# Patient Record
Sex: FEMALE | Race: WHITE | NOT HISPANIC OR LATINO
[De-identification: names, ages, dates, MRNs, and addresses within clinical notes are randomized per-mention and may not be internally consistent; named-entity substitution may affect disease eponyms.]

---

## 2018-01-02 ENCOUNTER — RX RENEWAL (OUTPATIENT)
Age: 63
End: 2018-01-02

## 2018-01-02 PROBLEM — Z00.00 ENCOUNTER FOR PREVENTIVE HEALTH EXAMINATION: Status: ACTIVE | Noted: 2018-01-02

## 2018-04-13 ENCOUNTER — RX RENEWAL (OUTPATIENT)
Age: 63
End: 2018-04-13

## 2018-06-18 ENCOUNTER — RECORD ABSTRACTING (OUTPATIENT)
Age: 63
End: 2018-06-18

## 2018-06-18 DIAGNOSIS — Z83.1 FAMILY HISTORY OF OTHER INFECTIOUS AND PARASITIC DISEASES: ICD-10-CM

## 2018-06-18 DIAGNOSIS — M35.3 POLYMYALGIA RHEUMATICA: ICD-10-CM

## 2018-06-19 ENCOUNTER — APPOINTMENT (OUTPATIENT)
Dept: FAMILY MEDICINE | Facility: CLINIC | Age: 63
End: 2018-06-19
Payer: COMMERCIAL

## 2018-06-19 VITALS
DIASTOLIC BLOOD PRESSURE: 62 MMHG | HEIGHT: 67 IN | OXYGEN SATURATION: 98 % | SYSTOLIC BLOOD PRESSURE: 124 MMHG | BODY MASS INDEX: 20.4 KG/M2 | RESPIRATION RATE: 14 BRPM | WEIGHT: 130 LBS | HEART RATE: 72 BPM

## 2018-06-19 DIAGNOSIS — E53.8 DEFICIENCY OF OTHER SPECIFIED B GROUP VITAMINS: ICD-10-CM

## 2018-06-19 DIAGNOSIS — E55.9 VITAMIN D DEFICIENCY, UNSPECIFIED: ICD-10-CM

## 2018-06-19 PROCEDURE — 99214 OFFICE O/P EST MOD 30 MIN: CPT

## 2018-06-19 NOTE — REVIEW OF SYSTEMS
[Joint Pain] : joint pain [Joint Stiffness] : joint stiffness [Joint Swelling] : joint swelling [Negative] : Heme/Lymph

## 2018-06-19 NOTE — ASSESSMENT
[FreeTextEntry1] : will check an xray of left knee, rx's given for mammogram and breast ultrasound, will check fasting labs, she was advised to schedule a screening colonoscopy and follow up in 6 months or sooner prn

## 2018-06-19 NOTE — HISTORY OF PRESENT ILLNESS
[FreeTextEntry8] : pt presents for pain in her left leg only when she walks for a little distance, and medication renewal.  She saw the rheumatologist last year for pain in her leg and was told it was sciatica and had PT.  The therapist advise her that they didn't think it was due to sciatica.  When she walks for a longer distance she develops pain behind the left knee that radiates to the front of the knee and down the leg to the foot.  She rests it for 5-10 minutes and it gets better.  She denies any numbness or tingling.  The pain is a dull ache.  She had temporary relief from lubricating injections in the left knee that were given by the rheumatologist

## 2018-06-19 NOTE — PHYSICAL EXAM
[No Acute Distress] : no acute distress [Well Nourished] : well nourished [Well Developed] : well developed [Well-Appearing] : well-appearing [Normal Voice/Communication] : normal voice/communication [No JVD] : no jugular venous distention [Supple] : supple [No Lymphadenopathy] : no lymphadenopathy [Thyroid Normal, No Nodules] : the thyroid was normal and there were no nodules present [No Respiratory Distress] : no respiratory distress  [Clear to Auscultation] : lungs were clear to auscultation bilaterally [No Accessory Muscle Use] : no accessory muscle use [Normal Rate] : normal rate  [Regular Rhythm] : with a regular rhythm [Normal S1, S2] : normal S1 and S2 [No Murmur] : no murmur heard [Speech Grossly Normal] : speech grossly normal [Memory Grossly Normal] : memory grossly normal [Normal Affect] : the affect was normal [Normal Mood] : the mood was normal [Normal Insight/Judgement] : insight and judgment were intact [de-identified] : left knee with bony swelling and crepitus, slightly decreased flexion and extension

## 2018-06-23 ENCOUNTER — TRANSCRIPTION ENCOUNTER (OUTPATIENT)
Age: 63
End: 2018-06-23

## 2018-07-09 ENCOUNTER — RESULT REVIEW (OUTPATIENT)
Age: 63
End: 2018-07-09

## 2018-07-16 ENCOUNTER — RESULT REVIEW (OUTPATIENT)
Age: 63
End: 2018-07-16

## 2018-07-16 RX ORDER — LEVOTHYROXINE SODIUM 0.14 MG/1
137 TABLET ORAL
Qty: 90 | Refills: 0 | Status: DISCONTINUED | COMMUNITY
Start: 2018-01-02 | End: 2018-07-16

## 2019-07-10 ENCOUNTER — TRANSCRIPTION ENCOUNTER (OUTPATIENT)
Age: 64
End: 2019-07-10

## 2019-07-16 ENCOUNTER — RX RENEWAL (OUTPATIENT)
Age: 64
End: 2019-07-16

## 2019-07-24 ENCOUNTER — APPOINTMENT (OUTPATIENT)
Dept: FAMILY MEDICINE | Facility: CLINIC | Age: 64
End: 2019-07-24
Payer: COMMERCIAL

## 2019-07-24 VITALS
WEIGHT: 128 LBS | HEART RATE: 55 BPM | HEIGHT: 67 IN | OXYGEN SATURATION: 98 % | SYSTOLIC BLOOD PRESSURE: 120 MMHG | RESPIRATION RATE: 14 BRPM | BODY MASS INDEX: 20.09 KG/M2 | DIASTOLIC BLOOD PRESSURE: 80 MMHG

## 2019-07-24 PROCEDURE — 99213 OFFICE O/P EST LOW 20 MIN: CPT

## 2019-07-24 NOTE — HISTORY OF PRESENT ILLNESS
[FreeTextEntry8] : Patient c/o right ear infection  X5 days.  Dr Ahuja has scheduled her for a left knee replacement in august.  She has had pain in the right ear for the past 5 days.  She was unable to see her ENT and isn't having any drainage from the ear.  She isn't taking any OTC tx.

## 2019-07-24 NOTE — PHYSICAL EXAM
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

## 2019-07-24 NOTE — REVIEW OF SYSTEMS
[Earache] : earache [Joint Pain] : joint pain [Joint Stiffness] : joint stiffness [Negative] : Heme/Lymph

## 2019-07-26 ENCOUNTER — OUTPATIENT (OUTPATIENT)
Dept: OUTPATIENT SERVICES | Facility: HOSPITAL | Age: 64
LOS: 1 days | End: 2019-07-26
Payer: COMMERCIAL

## 2019-07-26 PROCEDURE — 93010 ELECTROCARDIOGRAM REPORT: CPT

## 2019-07-31 ENCOUNTER — APPOINTMENT (OUTPATIENT)
Dept: FAMILY MEDICINE | Facility: CLINIC | Age: 64
End: 2019-07-31
Payer: COMMERCIAL

## 2019-07-31 VITALS
HEIGHT: 67 IN | SYSTOLIC BLOOD PRESSURE: 122 MMHG | RESPIRATION RATE: 14 BRPM | WEIGHT: 130 LBS | OXYGEN SATURATION: 97 % | DIASTOLIC BLOOD PRESSURE: 70 MMHG | BODY MASS INDEX: 20.4 KG/M2 | HEART RATE: 58 BPM

## 2019-07-31 DIAGNOSIS — H65.91 UNSPECIFIED NONSUPPURATIVE OTITIS MEDIA, RIGHT EAR: ICD-10-CM

## 2019-07-31 DIAGNOSIS — G89.29 PAIN IN LEFT KNEE: ICD-10-CM

## 2019-07-31 DIAGNOSIS — Z86.39 PERSONAL HISTORY OF OTHER ENDOCRINE, NUTRITIONAL AND METABOLIC DISEASE: ICD-10-CM

## 2019-07-31 DIAGNOSIS — M25.562 PAIN IN LEFT KNEE: ICD-10-CM

## 2019-07-31 DIAGNOSIS — Z87.898 PERSONAL HISTORY OF OTHER SPECIFIED CONDITIONS: ICD-10-CM

## 2019-07-31 PROCEDURE — 99242 OFF/OP CONSLTJ NEW/EST SF 20: CPT

## 2019-07-31 RX ORDER — AMOXICILLIN AND CLAVULANATE POTASSIUM 875; 125 MG/1; MG/1
875-125 TABLET, COATED ORAL
Qty: 20 | Refills: 0 | Status: DISCONTINUED | COMMUNITY
Start: 2019-07-24 | End: 2019-07-31

## 2019-07-31 NOTE — ASSESSMENT
[Patient Optimized for Surgery] : Patient optimized for surgery [Continue medications as is] : Continue current medications [No Further Testing Recommended] : no further testing recommended [FreeTextEntry4] : she is medically cleared for surgery [As per surgery] : as per surgery

## 2019-07-31 NOTE — HISTORY OF PRESENT ILLNESS
[No Pertinent Cardiac History] : no history of aortic stenosis, atrial fibrillation, coronary artery disease, recent myocardial infarction, or implantable device/pacemaker [No Pertinent Pulmonary History] : no history of asthma, COPD, sleep apnea, or smoking [No Adverse Anesthesia Reaction] : no adverse anesthesia reaction in self or family member [Aortic Stenosis] : no aortic stenosis [Atrial Fibrillation] : no atrial fibrillation [Coronary Artery Disease] : no coronary artery disease [Recent Myocardial Infarction] : no recent myocardial infarction [Implantable Device/Pacemaker] : no implantable device/pacemaker [Asthma] : no asthma [COPD] : no COPD [Sleep Apnea] : no sleep apnea [Smoker] : not a smoker [Family Member] : no family member with adverse anesthesia reaction/sudden death [Chronic Anticoagulation] : no chronic anticoagulation [Self] : no previous adverse anesthesia reaction [Chronic Kidney Disease] : no chronic kidney disease [Diabetes] : no diabetes [FreeTextEntry2] : 08/14/2019 [FreeTextEntry3] : Dr.Michael Ahuja [FreeTextEntry1] : left knee replacement [FreeTextEntry4] : Patient present for medical clearance\par

## 2019-07-31 NOTE — PHYSICAL EXAM
[No Acute Distress] : no acute distress [Well Nourished] : well nourished [Well-Appearing] : well-appearing [Well Developed] : well developed [Normal Voice/Communication] : normal voice/communication [Normal Sclera/Conjunctiva] : normal sclera/conjunctiva [Normal Outer Ear/Nose] : the outer ears and nose were normal in appearance [Normal Oropharynx] : the oropharynx was normal [No JVD] : no jugular venous distention [No Lymphadenopathy] : no lymphadenopathy [Supple] : supple [Thyroid Normal, No Nodules] : the thyroid was normal and there were no nodules present [No Respiratory Distress] : no respiratory distress  [No Accessory Muscle Use] : no accessory muscle use [Normal Rate] : normal rate  [Clear to Auscultation] : lungs were clear to auscultation bilaterally [Regular Rhythm] : with a regular rhythm [No Murmur] : no murmur heard [Normal S1, S2] : normal S1 and S2 [No Edema] : there was no peripheral edema [No Carotid Bruits] : no carotid bruits [Non Tender] : non-tender [Soft] : abdomen soft [No HSM] : no HSM [Non-distended] : non-distended [Normal Bowel Sounds] : normal bowel sounds [Speech Grossly Normal] : speech grossly normal [Normal Mood] : the mood was normal [Memory Grossly Normal] : memory grossly normal [Normal Insight/Judgement] : insight and judgment were intact [de-identified] : chronic perforation of right TM

## 2019-08-14 ENCOUNTER — INPATIENT (INPATIENT)
Facility: HOSPITAL | Age: 64
LOS: 2 days | Discharge: HOME HEALTH | End: 2019-08-17
Payer: COMMERCIAL

## 2019-08-14 ENCOUNTER — OUTPATIENT (OUTPATIENT)
Dept: OUTPATIENT SERVICES | Facility: HOSPITAL | Age: 64
LOS: 1 days | End: 2019-08-14

## 2019-08-14 PROCEDURE — 73560 X-RAY EXAM OF KNEE 1 OR 2: CPT | Mod: 26,LT

## 2019-08-15 ENCOUNTER — OUTPATIENT (OUTPATIENT)
Dept: OUTPATIENT SERVICES | Facility: HOSPITAL | Age: 64
LOS: 1 days | End: 2019-08-15

## 2019-08-16 ENCOUNTER — OUTPATIENT (OUTPATIENT)
Dept: OUTPATIENT SERVICES | Facility: HOSPITAL | Age: 64
LOS: 1 days | End: 2019-08-16

## 2019-08-17 ENCOUNTER — OUTPATIENT (OUTPATIENT)
Dept: OUTPATIENT SERVICES | Facility: HOSPITAL | Age: 64
LOS: 1 days | End: 2019-08-17

## 2019-10-12 ENCOUNTER — RX RENEWAL (OUTPATIENT)
Age: 64
End: 2019-10-12

## 2020-10-06 ENCOUNTER — RX RENEWAL (OUTPATIENT)
Age: 65
End: 2020-10-06

## 2020-10-12 ENCOUNTER — APPOINTMENT (OUTPATIENT)
Dept: FAMILY MEDICINE | Facility: CLINIC | Age: 65
End: 2020-10-12
Payer: COMMERCIAL

## 2020-10-12 VITALS
OXYGEN SATURATION: 98 % | RESPIRATION RATE: 14 BRPM | HEART RATE: 61 BPM | WEIGHT: 133 LBS | BODY MASS INDEX: 20.88 KG/M2 | HEIGHT: 67 IN | DIASTOLIC BLOOD PRESSURE: 70 MMHG | TEMPERATURE: 97 F | SYSTOLIC BLOOD PRESSURE: 140 MMHG

## 2020-10-12 VITALS — SYSTOLIC BLOOD PRESSURE: 126 MMHG | DIASTOLIC BLOOD PRESSURE: 66 MMHG

## 2020-10-12 PROCEDURE — 99213 OFFICE O/P EST LOW 20 MIN: CPT | Mod: 25

## 2020-10-12 PROCEDURE — G0008: CPT

## 2020-10-12 PROCEDURE — 90662 IIV NO PRSV INCREASED AG IM: CPT

## 2020-10-12 RX ORDER — MULTIVIT-MIN/IRON/FOLIC ACID/K 18-600-40
CAPSULE ORAL
Refills: 0 | Status: ACTIVE | COMMUNITY

## 2020-10-12 RX ORDER — B-COMPLEX WITH VITAMIN C
TABLET ORAL
Refills: 0 | Status: ACTIVE | COMMUNITY

## 2020-10-12 RX ORDER — PNV NO.95/FERROUS FUM/FOLIC AC 28MG-0.8MG
TABLET ORAL
Refills: 0 | Status: ACTIVE | COMMUNITY

## 2020-10-12 NOTE — HISTORY OF PRESENT ILLNESS
[FreeTextEntry1] : Patient present for medication renewal\par  [de-identified] : The knee replacement surgery went well.  She is feeling well and denies any health changes.

## 2020-10-12 NOTE — PLAN
[FreeTextEntry1] : fasting labs were ordered, she refused to schedule a screening colonoscopy, fecal occult blood testing was ordered, mammogram rx was provided, flu vaccine was given, levothyroxine was renewed and she was advised to follow up in 1 month for Prevnar 13, she declined scheduling a well visit

## 2020-10-21 DIAGNOSIS — Z92.29 PERSONAL HISTORY OF OTHER DRUG THERAPY: ICD-10-CM

## 2021-04-30 ENCOUNTER — APPOINTMENT (OUTPATIENT)
Dept: FAMILY MEDICINE | Facility: CLINIC | Age: 66
End: 2021-04-30
Payer: COMMERCIAL

## 2021-04-30 ENCOUNTER — TRANSCRIPTION ENCOUNTER (OUTPATIENT)
Age: 66
End: 2021-04-30

## 2021-04-30 VITALS
BODY MASS INDEX: 21.19 KG/M2 | OXYGEN SATURATION: 98 % | HEIGHT: 67 IN | TEMPERATURE: 97.3 F | HEART RATE: 54 BPM | RESPIRATION RATE: 14 BRPM | DIASTOLIC BLOOD PRESSURE: 64 MMHG | WEIGHT: 135 LBS | SYSTOLIC BLOOD PRESSURE: 104 MMHG

## 2021-04-30 DIAGNOSIS — Z23 ENCOUNTER FOR IMMUNIZATION: ICD-10-CM

## 2021-04-30 PROCEDURE — 99072 ADDL SUPL MATRL&STAF TM PHE: CPT

## 2021-04-30 PROCEDURE — 99213 OFFICE O/P EST LOW 20 MIN: CPT

## 2021-04-30 NOTE — HISTORY OF PRESENT ILLNESS
[FreeTextEntry8] : pt has teary eyes, and wakes up with crusty and gook around eyes.  Her eyes have been tearing and she had discolored mucous.  They aren't very itchy.  She tried benadryl without improvement.  She is scheduled for the mammogram.

## 2021-04-30 NOTE — PLAN
[FreeTextEntry1] : she was advised to wash her towels, pillowcases and hands often, she will use the drops as directed, she was again advised to do the fecal occult testing

## 2021-04-30 NOTE — PHYSICAL EXAM
[No Acute Distress] : no acute distress [Well Nourished] : well nourished [Well Developed] : well developed [Well-Appearing] : well-appearing [Normal Voice/Communication] : normal voice/communication [No Respiratory Distress] : no respiratory distress  [Normal Mood] : the mood was normal [de-identified] : mild injection of eyes, right eye is worse

## 2021-04-30 NOTE — REVIEW OF SYSTEMS
[Fever] : no fever [Discharge] : discharge [Pain] : no pain [Redness] : redness [Itching] : no itching [Negative] : Heme/Lymph

## 2021-06-26 ENCOUNTER — TRANSCRIPTION ENCOUNTER (OUTPATIENT)
Age: 66
End: 2021-06-26

## 2021-07-02 ENCOUNTER — TRANSCRIPTION ENCOUNTER (OUTPATIENT)
Age: 66
End: 2021-07-02

## 2021-08-04 ENCOUNTER — TRANSCRIPTION ENCOUNTER (OUTPATIENT)
Age: 66
End: 2021-08-04

## 2021-08-07 ENCOUNTER — APPOINTMENT (OUTPATIENT)
Dept: FAMILY MEDICINE | Facility: CLINIC | Age: 66
End: 2021-08-07
Payer: COMMERCIAL

## 2021-08-07 DIAGNOSIS — H10.89 OTHER CONJUNCTIVITIS: ICD-10-CM

## 2021-08-07 PROCEDURE — 99212 OFFICE O/P EST SF 10 MIN: CPT | Mod: 95

## 2021-08-07 RX ORDER — OFLOXACIN 3 MG/ML
0.3 SOLUTION/ DROPS OPHTHALMIC
Qty: 1 | Refills: 0 | Status: DISCONTINUED | COMMUNITY
Start: 2021-04-30 | End: 2021-08-07

## 2021-08-07 NOTE — PHYSICAL EXAM
[No Acute Distress] : no acute distress [Well Nourished] : well nourished [Well Developed] : well developed [Well-Appearing] : well-appearing [Normal Voice/Communication] : normal voice/communication [No Respiratory Distress] : no respiratory distress  [Normal Mood] : the mood was normal [de-identified] : erythema in posterior pharynx

## 2021-08-07 NOTE — HISTORY OF PRESENT ILLNESS
[Home] : at home, [unfilled] , at the time of the visit. [Medical Office: ([facility])___] : at the medical office located in  [Verbal consent obtained from patient] : the patient, [unfilled] [FreeTextEntry8] : After receiving verbal consent the visit was performed virtually via [employer] as the patient was unable to be seen in the office due to the COVID 19 pandemic.  She started with a sore throat and a hoarse voice on 8/4 and now is coughing.  The cough is dry but she feels mucous in the back of her throat that she is unable to cough up.  She hasn't had a fever.  The throat is very painful.  She had a negative rapid covid swab on 8/4.  She is taking Rosalind Juncos Plus and cough medication.\par

## 2021-08-07 NOTE — REVIEW OF SYSTEMS
[Fever] : no fever [Chills] : no chills [Fatigue] : fatigue [Earache] : no earache [Nasal Discharge] : no nasal discharge [Sore Throat] : sore throat [Postnasal Drip] : postnasal drip [Shortness Of Breath] : no shortness of breath [Wheezing] : no wheezing [Cough] : cough [Negative] : Heme/Lymph

## 2021-08-07 NOTE — PLAN
[FreeTextEntry1] : she was advised to be seen at urgent care today to be tested for strep and Covid and for further treatment if needed

## 2021-10-07 ENCOUNTER — RX RENEWAL (OUTPATIENT)
Age: 66
End: 2021-10-07

## 2022-01-03 ENCOUNTER — RX RENEWAL (OUTPATIENT)
Age: 67
End: 2022-01-03

## 2022-03-02 ENCOUNTER — APPOINTMENT (OUTPATIENT)
Dept: OPHTHALMOLOGY | Facility: CLINIC | Age: 67
End: 2022-03-02
Payer: COMMERCIAL

## 2022-03-02 ENCOUNTER — NON-APPOINTMENT (OUTPATIENT)
Age: 67
End: 2022-03-02

## 2022-03-02 PROCEDURE — 92014 COMPRE OPH EXAM EST PT 1/>: CPT

## 2022-04-03 ENCOUNTER — RX RENEWAL (OUTPATIENT)
Age: 67
End: 2022-04-03

## 2022-04-05 ENCOUNTER — RX RENEWAL (OUTPATIENT)
Age: 67
End: 2022-04-05

## 2022-04-06 ENCOUNTER — TRANSCRIPTION ENCOUNTER (OUTPATIENT)
Age: 67
End: 2022-04-06

## 2022-04-16 ENCOUNTER — TRANSCRIPTION ENCOUNTER (OUTPATIENT)
Age: 67
End: 2022-04-16

## 2022-06-17 ENCOUNTER — APPOINTMENT (OUTPATIENT)
Dept: OPHTHALMOLOGY | Facility: CLINIC | Age: 67
End: 2022-06-17
Payer: COMMERCIAL

## 2022-06-17 ENCOUNTER — NON-APPOINTMENT (OUTPATIENT)
Age: 67
End: 2022-06-17

## 2022-06-17 PROCEDURE — 92012 INTRM OPH EXAM EST PATIENT: CPT

## 2022-06-30 ENCOUNTER — APPOINTMENT (OUTPATIENT)
Dept: FAMILY MEDICINE | Facility: CLINIC | Age: 67
End: 2022-06-30

## 2022-06-30 VITALS
HEART RATE: 72 BPM | TEMPERATURE: 97 F | RESPIRATION RATE: 13 BRPM | OXYGEN SATURATION: 97 % | BODY MASS INDEX: 20.72 KG/M2 | WEIGHT: 132 LBS | DIASTOLIC BLOOD PRESSURE: 62 MMHG | SYSTOLIC BLOOD PRESSURE: 124 MMHG | HEIGHT: 67 IN

## 2022-06-30 VITALS
OXYGEN SATURATION: 97 % | HEART RATE: 72 BPM | SYSTOLIC BLOOD PRESSURE: 124 MMHG | WEIGHT: 132 LBS | BODY MASS INDEX: 20.72 KG/M2 | RESPIRATION RATE: 13 BRPM | DIASTOLIC BLOOD PRESSURE: 62 MMHG | HEIGHT: 67 IN

## 2022-06-30 VITALS — TEMPERATURE: 97 F

## 2022-06-30 DIAGNOSIS — J06.9 ACUTE UPPER RESPIRATORY INFECTION, UNSPECIFIED: ICD-10-CM

## 2022-06-30 PROCEDURE — 99214 OFFICE O/P EST MOD 30 MIN: CPT

## 2022-06-30 RX ORDER — MELOXICAM 15 MG/1
15 TABLET ORAL
Qty: 30 | Refills: 0 | Status: DISCONTINUED | COMMUNITY
Start: 2022-05-11

## 2022-06-30 NOTE — HISTORY OF PRESENT ILLNESS
[FreeTextEntry1] : Patient presents for medication renewal [de-identified] : She reports that she is doing well except she needs the other knee replaced.

## 2022-06-30 NOTE — PHYSICAL EXAM
[No Acute Distress] : no acute distress [Well Nourished] : well nourished [Well Developed] : well developed [Well-Appearing] : well-appearing [Normal Voice/Communication] : normal voice/communication [No Lymphadenopathy] : no lymphadenopathy [Supple] : supple [No Respiratory Distress] : no respiratory distress  [No Accessory Muscle Use] : no accessory muscle use [Clear to Auscultation] : lungs were clear to auscultation bilaterally [Normal Rate] : normal rate  [Regular Rhythm] : with a regular rhythm [Normal S1, S2] : normal S1 and S2 [Coordination Grossly Intact] : coordination grossly intact [Normal Mood] : the mood was normal

## 2022-06-30 NOTE — PLAN
[FreeTextEntry1] : her condition is overall stable and no medication changes were made, labs were ordered to further assess her health, see orders for details, mammogram and breast ultrasound and colonoscopy ordered and she was advised to follow up for a well visit

## 2022-07-05 DIAGNOSIS — E78.00 PURE HYPERCHOLESTEROLEMIA, UNSPECIFIED: ICD-10-CM

## 2022-07-05 RX ORDER — LEVOTHYROXINE SODIUM 0.12 MG/1
125 TABLET ORAL
Qty: 90 | Refills: 0 | Status: DISCONTINUED | COMMUNITY
Start: 2018-07-16 | End: 2022-07-05

## 2022-07-08 ENCOUNTER — APPOINTMENT (OUTPATIENT)
Dept: OPHTHALMOLOGY | Facility: CLINIC | Age: 67
End: 2022-07-08

## 2022-07-08 ENCOUNTER — NON-APPOINTMENT (OUTPATIENT)
Age: 67
End: 2022-07-08

## 2022-07-08 PROCEDURE — 92012 INTRM OPH EXAM EST PATIENT: CPT

## 2022-10-10 ENCOUNTER — APPOINTMENT (OUTPATIENT)
Dept: OPHTHALMOLOGY | Facility: CLINIC | Age: 67
End: 2022-10-10

## 2022-12-26 ENCOUNTER — RX RENEWAL (OUTPATIENT)
Age: 67
End: 2022-12-26

## 2023-02-01 ENCOUNTER — RESULT REVIEW (OUTPATIENT)
Age: 68
End: 2023-02-01

## 2023-06-26 ENCOUNTER — RX RENEWAL (OUTPATIENT)
Age: 68
End: 2023-06-26

## 2023-09-07 ENCOUNTER — APPOINTMENT (OUTPATIENT)
Dept: FAMILY MEDICINE | Facility: CLINIC | Age: 68
End: 2023-09-07
Payer: COMMERCIAL

## 2023-09-07 VITALS
WEIGHT: 131 LBS | SYSTOLIC BLOOD PRESSURE: 120 MMHG | HEIGHT: 67 IN | DIASTOLIC BLOOD PRESSURE: 70 MMHG | RESPIRATION RATE: 12 BRPM | OXYGEN SATURATION: 98 % | BODY MASS INDEX: 20.56 KG/M2 | HEART RATE: 67 BPM

## 2023-09-07 DIAGNOSIS — R69 ILLNESS, UNSPECIFIED: ICD-10-CM

## 2023-09-07 PROCEDURE — 99213 OFFICE O/P EST LOW 20 MIN: CPT

## 2023-09-07 RX ORDER — CIPROFLOXACIN AND DEXAMETHASONE 3; 1 MG/ML; MG/ML
0.3-0.1 SUSPENSION/ DROPS AURICULAR (OTIC)
Qty: 8 | Refills: 0 | Status: DISCONTINUED | COMMUNITY
Start: 2022-01-24 | End: 2023-09-07

## 2023-09-07 RX ORDER — NEOMYCIN AND POLYMYXIN B SULFATES AND DEXAMETHASONE 3.5; 10000; 1 MG/G; [IU]/G; MG/G
3.5-10000-0.1 OINTMENT OPHTHALMIC
Qty: 4 | Refills: 0 | Status: DISCONTINUED | COMMUNITY
Start: 2022-06-17 | End: 2023-09-07

## 2023-09-07 NOTE — ASSESSMENT
[Patient Optimized for Surgery] : Patient optimized for surgery [No Further Testing Recommended] : no further testing recommended [Modify medications prior to procedure] : Modify medications prior to procedure [As per surgery] : as per surgery [FreeTextEntry4] : Patient is low risk and medically cleared for planned procedure on 9/11/23 presurgical labs and EKG have been reviewed  Denies history of Myocardial infraction, CVA and DUBOIS  May continue to take prescribed medications as directed.  NPO after midnight prior to surgery  may take morning medications with sips of water morning of procedure  Advised not to take ASA and or NSAIDs prior  to surgery

## 2023-09-07 NOTE — PHYSICAL EXAM
[No Acute Distress] : no acute distress [Normal Outer Ear/Nose] : the outer ears and nose were normal in appearance [No JVD] : no jugular venous distention [No Lymphadenopathy] : no lymphadenopathy [Supple] : supple [No Respiratory Distress] : no respiratory distress  [No Accessory Muscle Use] : no accessory muscle use [Clear to Auscultation] : lungs were clear to auscultation bilaterally [Normal Rate] : normal rate  [Regular Rhythm] : with a regular rhythm [Normal S1, S2] : normal S1 and S2 [No Extremity Clubbing/Cyanosis] : no extremity clubbing/cyanosis [Normal Gait] : normal gait [Normal Affect] : the affect was normal

## 2023-09-07 NOTE — HISTORY OF PRESENT ILLNESS
[No Pertinent Cardiac History] : no history of aortic stenosis, atrial fibrillation, coronary artery disease, recent myocardial infarction, or implantable device/pacemaker [No Pertinent Pulmonary History] : no history of asthma, COPD, sleep apnea, or smoking [No Adverse Anesthesia Reaction] : no adverse anesthesia reaction in self or family member [(Patient denies any chest pain, claudication, dyspnea on exertion, orthopnea, palpitations or syncope)] : Patient denies any chest pain, claudication, dyspnea on exertion, orthopnea, palpitations or syncope [Chronic Anticoagulation] : no chronic anticoagulation [Chronic Kidney Disease] : no chronic kidney disease [Diabetes] : no diabetes [FreeTextEntry1] : 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 Munson [FreeTextEntry4] : pt presents for medical clearance . Denies changes in vision, dizziness, chest pain, palpitations and lower extremity edema.

## 2023-09-15 ASSESSMENT — KOOS JR
HOW SEVERE IS YOUR KNEE STIFFNESS AFTER FIRST WAKING IN MORNING: MODERATE
HOW SEVERE IS YOUR KNEE STIFFNESS AFTER FIRST WAKING IN MORNING: MODERATE
RISING FROM SITTING: MILD
TWISING OR PIVOTING ON KNEE: MILD
IMPORTED FORM: YES
RISING FROM SITTING: MILD
KOOS JR RAW SCORE: 13
IMPORTED KOOS JR SCORE: 61.58
TWISING OR PIVOTING ON KNEE: SEVERE
GOING UP OR DOWN STAIRS: MILD
IMPORTED KOOS JR SCORE: 54.84
RISING FROM SITTING: MILD
TWISING OR PIVOTING ON KNEE: MILD
IMPORTED KOOS JR SCORE: 76.33
BENDING TO THE FLOOR TO PICK UP OBJECT: MODERATE
IMPORTED LATERALITY: LEFT
STRAIGHTENING KNEE FULLY: MILD
KOOS JR RAW SCORE: 10
STANDING UPRIGHT: MILD
IMPORTED KOOS JR SCORE: 63.78
IMPORTED LATERALITY: LEFT
IMPORTED LATERALITY: LEFT
IMPORTED KOOS JR SCORE: 63.78
KOOS JR RAW SCORE: 4
HOW SEVERE IS YOUR KNEE STIFFNESS AFTER FIRST WAKING IN MORNING: MILD
STRAIGHTENING KNEE FULLY: MILD
BENDING TO THE FLOOR TO PICK UP OBJECT: MILD
STRAIGHTENING KNEE FULLY: MODERATE
STRAIGHTENING KNEE FULLY: MODERATE
KOOS JR RAW SCORE: 10
HOW SEVERE IS YOUR KNEE STIFFNESS AFTER FIRST WAKING IN MORNING: MILD
HOW SEVERE IS YOUR KNEE STIFFNESS AFTER FIRST WAKING IN MORNING: MODERATE
KOOS JR RAW SCORE: 4
GOING UP OR DOWN STAIRS: MILD
TWISING OR PIVOTING ON KNEE: SEVERE
STRAIGHTENING KNEE FULLY: MILD
KOOS JR RAW SCORE: 13
IMPORTED KOOS JR SCORE: 54.84
IMPORTED KOOS JR SCORE: 76.33
BENDING TO THE FLOOR TO PICK UP OBJECT: MILD
IMPORTED FORM: YES
IMPORTED LATERALITY: LEFT
GOING UP OR DOWN STAIRS: MILD
IMPORTED KOOS JR SCORE: 61.58
GOING UP OR DOWN STAIRS: MODERATE
RISING FROM SITTING: MILD
IMPORTED FORM: YES
HOW SEVERE IS YOUR KNEE STIFFNESS AFTER FIRST WAKING IN MORNING: MODERATE
STANDING UPRIGHT: MILD
KOOS JR RAW SCORE: 9
GOING UP OR DOWN STAIRS: MODERATE
STRAIGHTENING KNEE FULLY: MILD
BENDING TO THE FLOOR TO PICK UP OBJECT: MILD
IMPORTED FORM: YES
TWISING OR PIVOTING ON KNEE: MODERATE
STANDING UPRIGHT: MILD
STANDING UPRIGHT: MILD
KOOS JR RAW SCORE: 9
BENDING TO THE FLOOR TO PICK UP OBJECT: MODERATE
GOING UP OR DOWN STAIRS: MILD
BENDING TO THE FLOOR TO PICK UP OBJECT: MILD
TWISING OR PIVOTING ON KNEE: MODERATE

## 2023-12-27 ENCOUNTER — RX RENEWAL (OUTPATIENT)
Age: 68
End: 2023-12-27

## 2024-02-21 ENCOUNTER — NON-APPOINTMENT (OUTPATIENT)
Age: 69
End: 2024-02-21

## 2024-02-21 ENCOUNTER — APPOINTMENT (OUTPATIENT)
Dept: OPHTHALMOLOGY | Facility: CLINIC | Age: 69
End: 2024-02-21
Payer: SELF-PAY

## 2024-02-21 ENCOUNTER — APPOINTMENT (OUTPATIENT)
Dept: OPHTHALMOLOGY | Facility: CLINIC | Age: 69
End: 2024-02-21
Payer: COMMERCIAL

## 2024-02-21 PROCEDURE — 92015 DETERMINE REFRACTIVE STATE: CPT

## 2024-02-21 PROCEDURE — 92014 COMPRE OPH EXAM EST PT 1/>: CPT

## 2024-03-13 ENCOUNTER — APPOINTMENT (OUTPATIENT)
Dept: FAMILY MEDICINE | Facility: CLINIC | Age: 69
End: 2024-03-13
Payer: COMMERCIAL

## 2024-03-13 VITALS
TEMPERATURE: 97.9 F | WEIGHT: 135 LBS | RESPIRATION RATE: 12 BRPM | HEIGHT: 67 IN | BODY MASS INDEX: 21.19 KG/M2 | OXYGEN SATURATION: 96 % | DIASTOLIC BLOOD PRESSURE: 78 MMHG | HEART RATE: 63 BPM | SYSTOLIC BLOOD PRESSURE: 120 MMHG

## 2024-03-13 DIAGNOSIS — Z23 ENCOUNTER FOR IMMUNIZATION: ICD-10-CM

## 2024-03-13 DIAGNOSIS — Z12.11 ENCOUNTER FOR SCREENING FOR MALIGNANT NEOPLASM OF COLON: ICD-10-CM

## 2024-03-13 DIAGNOSIS — Z01.818 ENCOUNTER FOR OTHER PREPROCEDURAL EXAMINATION: ICD-10-CM

## 2024-03-13 DIAGNOSIS — R92.30 DENSE BREASTS, UNSPECIFIED: ICD-10-CM

## 2024-03-13 DIAGNOSIS — M25.561 PAIN IN RIGHT KNEE: ICD-10-CM

## 2024-03-13 DIAGNOSIS — G89.29 PAIN IN RIGHT KNEE: ICD-10-CM

## 2024-03-13 DIAGNOSIS — R79.89 OTHER SPECIFIED ABNORMAL FINDINGS OF BLOOD CHEMISTRY: ICD-10-CM

## 2024-03-13 DIAGNOSIS — Z12.39 ENCOUNTER FOR OTHER SCREENING FOR MALIGNANT NEOPLASM OF BREAST: ICD-10-CM

## 2024-03-13 DIAGNOSIS — Z86.03 PERSONAL HISTORY OF NEOPLASM OF UNCERTAIN BEHAVIOR: ICD-10-CM

## 2024-03-13 DIAGNOSIS — E03.9 HYPOTHYROIDISM, UNSPECIFIED: ICD-10-CM

## 2024-03-13 PROCEDURE — 36415 COLL VENOUS BLD VENIPUNCTURE: CPT

## 2024-03-13 PROCEDURE — 99213 OFFICE O/P EST LOW 20 MIN: CPT

## 2024-03-14 ENCOUNTER — TRANSCRIPTION ENCOUNTER (OUTPATIENT)
Age: 69
End: 2024-03-14

## 2024-03-15 ENCOUNTER — TRANSCRIPTION ENCOUNTER (OUTPATIENT)
Age: 69
End: 2024-03-15

## 2024-03-16 LAB
BASOPHILS # BLD AUTO: 0.03 K/UL
BASOPHILS NFR BLD AUTO: 0.7 %
EOSINOPHIL # BLD AUTO: 0.1 K/UL
EOSINOPHIL NFR BLD AUTO: 2.3 %
HCT VFR BLD CALC: 35.3 %
HGB BLD-MCNC: 11.6 G/DL
IMM GRANULOCYTES NFR BLD AUTO: 0.2 %
LYMPHOCYTES # BLD AUTO: 1.27 K/UL
LYMPHOCYTES NFR BLD AUTO: 28.7 %
MAN DIFF?: NORMAL
MCHC RBC-ENTMCNC: 28.2 PG
MCHC RBC-ENTMCNC: 32.9 GM/DL
MCV RBC AUTO: 85.9 FL
MONOCYTES # BLD AUTO: 0.36 K/UL
MONOCYTES NFR BLD AUTO: 8.1 %
NEUTROPHILS # BLD AUTO: 2.66 K/UL
NEUTROPHILS NFR BLD AUTO: 60 %
PLATELET # BLD AUTO: 259 K/UL
RBC # BLD: 4.11 M/UL
RBC # FLD: 13.9 %
WBC # FLD AUTO: 4.43 K/UL

## 2024-03-16 NOTE — PHYSICAL EXAM
[No Acute Distress] : no acute distress [Normal Sclera/Conjunctiva] : normal sclera/conjunctiva [PERRL] : pupils equal round and reactive to light [EOMI] : extraocular movements intact [Normal Outer Ear/Nose] : the outer ears and nose were normal in appearance [Normal TMs] : both tympanic membranes were normal [Normal Oropharynx] : the oropharynx was normal [No JVD] : no jugular venous distention [No Lymphadenopathy] : no lymphadenopathy [Thyroid Normal, No Nodules] : the thyroid was normal and there were no nodules present [Supple] : supple [No Accessory Muscle Use] : no accessory muscle use [No Respiratory Distress] : no respiratory distress  [Clear to Auscultation] : lungs were clear to auscultation bilaterally [Normal Rate] : normal rate  [Regular Rhythm] : with a regular rhythm [Normal S1, S2] : normal S1 and S2 [No Murmur] : no murmur heard [No Carotid Bruits] : no carotid bruits [No Extremity Clubbing/Cyanosis] : no extremity clubbing/cyanosis [Pedal Pulses Present] : the pedal pulses are present [Soft] : abdomen soft [Non Tender] : non-tender [Non-distended] : non-distended [No HSM] : no HSM [Normal Bowel Sounds] : normal bowel sounds [Normal Anterior Cervical Nodes] : no anterior cervical lymphadenopathy [Normal Posterior Cervical Nodes] : no posterior cervical lymphadenopathy [Grossly Normal Strength/Tone] : grossly normal strength/tone [No Spinal Tenderness] : no spinal tenderness [Normal Gait] : normal gait [Normal Affect] : the affect was normal [Deep Tendon Reflexes (DTR)] : deep tendon reflexes were 2+ and symmetric

## 2024-03-16 NOTE — HISTORY OF PRESENT ILLNESS
[No Pertinent Cardiac History] : no history of aortic stenosis, atrial fibrillation, coronary artery disease, recent myocardial infarction, or implantable device/pacemaker [No Pertinent Pulmonary History] : no history of asthma, COPD, sleep apnea, or smoking [No Adverse Anesthesia Reaction] : no adverse anesthesia reaction in self or family member [(Patient denies any chest pain, claudication, dyspnea on exertion, orthopnea, palpitations or syncope)] : Patient denies any chest pain, claudication, dyspnea on exertion, orthopnea, palpitations or syncope [Chronic Kidney Disease] : no chronic kidney disease [Chronic Anticoagulation] : no chronic anticoagulation [Diabetes] : no diabetes [FreeTextEntry1] : Right total knee peplacement [FreeTextEntry2] : 03/27/2024 [FreeTextEntry3] : Dr. Abbott at [facility] [FreeTextEntry4] : 68F is here for medical clearance. PMHx hypothyroidism. Denies changes in vision, dizziness, chest pain, palpitations and lower extremity edema.

## 2024-03-16 NOTE — ASSESSMENT
[Patient Optimized for Surgery] : Patient optimized for surgery [No Further Testing Recommended] : no further testing recommended [Modify medications prior to procedure] : Modify medications prior to procedure [As per surgery] : as per surgery [FreeTextEntry4] : Patient is low risk and medically cleared for planned procedure on 03/27/2024 presurgical labs and EKG have been reviewed  Denies history of Myocardial infraction, CVA and DUBOIS  May continue to take prescribed medications as directed.  NPO after midnight prior to surgery  may take morning medications with sips of water morning of procedure  Advised not to take ASA and or NSAIDs prior  to surgery

## 2024-03-28 ENCOUNTER — TRANSCRIPTION ENCOUNTER (OUTPATIENT)
Age: 69
End: 2024-03-28

## 2024-03-30 ENCOUNTER — TRANSCRIPTION ENCOUNTER (OUTPATIENT)
Age: 69
End: 2024-03-30

## 2024-03-31 ENCOUNTER — TRANSCRIPTION ENCOUNTER (OUTPATIENT)
Age: 69
End: 2024-03-31

## 2024-04-30 ENCOUNTER — TRANSCRIPTION ENCOUNTER (OUTPATIENT)
Age: 69
End: 2024-04-30

## 2024-05-23 ENCOUNTER — TRANSCRIPTION ENCOUNTER (OUTPATIENT)
Age: 69
End: 2024-05-23

## 2024-06-22 ENCOUNTER — RX RENEWAL (OUTPATIENT)
Age: 69
End: 2024-06-22

## 2024-06-22 RX ORDER — LEVOTHYROXINE SODIUM 0.11 MG/1
112 TABLET ORAL
Qty: 90 | Refills: 1 | Status: ACTIVE | COMMUNITY
Start: 2022-07-05 | End: 1900-01-01

## 2024-08-06 ENCOUNTER — APPOINTMENT (OUTPATIENT)
Dept: OPHTHALMOLOGY | Facility: CLINIC | Age: 69
End: 2024-08-06

## 2024-08-06 ENCOUNTER — NON-APPOINTMENT (OUTPATIENT)
Age: 69
End: 2024-08-06

## 2024-08-06 PROCEDURE — 92012 INTRM OPH EXAM EST PATIENT: CPT

## 2024-08-27 ENCOUNTER — APPOINTMENT (OUTPATIENT)
Dept: OPHTHALMOLOGY | Facility: CLINIC | Age: 69
End: 2024-08-27
Payer: COMMERCIAL

## 2024-08-27 ENCOUNTER — NON-APPOINTMENT (OUTPATIENT)
Age: 69
End: 2024-08-27

## 2024-08-27 PROCEDURE — 92012 INTRM OPH EXAM EST PATIENT: CPT

## 2024-10-14 ENCOUNTER — APPOINTMENT (OUTPATIENT)
Dept: FAMILY MEDICINE | Facility: CLINIC | Age: 69
End: 2024-10-14
Payer: COMMERCIAL

## 2024-10-14 VITALS
OXYGEN SATURATION: 98 % | TEMPERATURE: 97.7 F | BODY MASS INDEX: 21.03 KG/M2 | DIASTOLIC BLOOD PRESSURE: 78 MMHG | WEIGHT: 134 LBS | HEART RATE: 58 BPM | HEIGHT: 67 IN | RESPIRATION RATE: 14 BRPM | SYSTOLIC BLOOD PRESSURE: 114 MMHG

## 2024-10-14 DIAGNOSIS — Z01.818 ENCOUNTER FOR OTHER PREPROCEDURAL EXAMINATION: ICD-10-CM

## 2024-10-14 DIAGNOSIS — N60.12 DIFFUSE CYSTIC MASTOPATHY OF RIGHT BREAST: ICD-10-CM

## 2024-10-14 DIAGNOSIS — Z23 ENCOUNTER FOR IMMUNIZATION: ICD-10-CM

## 2024-10-14 DIAGNOSIS — G89.29 PAIN IN RIGHT KNEE: ICD-10-CM

## 2024-10-14 DIAGNOSIS — Z96.653 PRESENCE OF ARTIFICIAL KNEE JOINT, BILATERAL: ICD-10-CM

## 2024-10-14 DIAGNOSIS — M25.562 PAIN IN LEFT KNEE: ICD-10-CM

## 2024-10-14 DIAGNOSIS — N60.11 DIFFUSE CYSTIC MASTOPATHY OF RIGHT BREAST: ICD-10-CM

## 2024-10-14 DIAGNOSIS — Z12.11 ENCOUNTER FOR SCREENING FOR MALIGNANT NEOPLASM OF COLON: ICD-10-CM

## 2024-10-14 DIAGNOSIS — Z12.39 ENCOUNTER FOR OTHER SCREENING FOR MALIGNANT NEOPLASM OF BREAST: ICD-10-CM

## 2024-10-14 DIAGNOSIS — Z00.00 ENCOUNTER FOR GENERAL ADULT MEDICAL EXAMINATION W/OUT ABNORMAL FINDINGS: ICD-10-CM

## 2024-10-14 DIAGNOSIS — M25.561 PAIN IN RIGHT KNEE: ICD-10-CM

## 2024-10-14 DIAGNOSIS — Z13.820 ENCOUNTER FOR SCREENING FOR OSTEOPOROSIS: ICD-10-CM

## 2024-10-14 DIAGNOSIS — G89.29 PAIN IN LEFT KNEE: ICD-10-CM

## 2024-10-14 PROCEDURE — G0008: CPT

## 2024-10-14 PROCEDURE — 90662 IIV NO PRSV INCREASED AG IM: CPT

## 2024-10-14 PROCEDURE — G0439: CPT

## 2024-10-14 RX ORDER — CHROMIUM 200 MCG
TABLET ORAL
Refills: 0 | Status: ACTIVE | COMMUNITY

## 2024-10-24 ENCOUNTER — NON-APPOINTMENT (OUTPATIENT)
Age: 69
End: 2024-10-24

## 2024-10-24 ENCOUNTER — TRANSCRIPTION ENCOUNTER (OUTPATIENT)
Age: 69
End: 2024-10-24

## 2024-11-16 ENCOUNTER — RESULT REVIEW (OUTPATIENT)
Age: 69
End: 2024-11-16

## 2024-11-18 DIAGNOSIS — M81.0 AGE-RELATED OSTEOPOROSIS W/OUT CURRENT PATHOLOGICAL FRACTURE: ICD-10-CM

## 2024-11-18 DIAGNOSIS — Z13.820 ENCOUNTER FOR SCREENING FOR OSTEOPOROSIS: ICD-10-CM

## 2024-11-19 RX ORDER — ALENDRONATE SODIUM 70 MG/1
70 TABLET ORAL
Qty: 12 | Refills: 3 | Status: DISCONTINUED | COMMUNITY
Start: 2024-11-18 | End: 2024-11-19

## 2024-12-16 ENCOUNTER — RX RENEWAL (OUTPATIENT)
Age: 69
End: 2024-12-16

## 2025-05-05 ENCOUNTER — NON-APPOINTMENT (OUTPATIENT)
Age: 70
End: 2025-05-05

## 2025-05-06 ENCOUNTER — APPOINTMENT (OUTPATIENT)
Dept: OPHTHALMOLOGY | Facility: CLINIC | Age: 70
End: 2025-05-06
Payer: COMMERCIAL

## 2025-05-06 ENCOUNTER — NON-APPOINTMENT (OUTPATIENT)
Age: 70
End: 2025-05-06

## 2025-05-06 PROCEDURE — 92014 COMPRE OPH EXAM EST PT 1/>: CPT

## 2025-06-09 ENCOUNTER — RX RENEWAL (OUTPATIENT)
Age: 70
End: 2025-06-09

## 2025-09-02 ENCOUNTER — NON-APPOINTMENT (OUTPATIENT)
Age: 70
End: 2025-09-02

## 2025-09-02 ENCOUNTER — APPOINTMENT (OUTPATIENT)
Dept: OPHTHALMOLOGY | Facility: CLINIC | Age: 70
End: 2025-09-02
Payer: MEDICARE

## 2025-09-02 PROCEDURE — 92002 INTRM OPH EXAM NEW PATIENT: CPT
